# Patient Record
Sex: FEMALE | Race: AMERICAN INDIAN OR ALASKA NATIVE
[De-identification: names, ages, dates, MRNs, and addresses within clinical notes are randomized per-mention and may not be internally consistent; named-entity substitution may affect disease eponyms.]

---

## 2017-08-03 ENCOUNTER — HOSPITAL ENCOUNTER (EMERGENCY)
Dept: HOSPITAL 11 - JP.ED | Age: 77
LOS: 1 days | Discharge: NURSING, REHAB OR LONG-TERM CARE | End: 2017-08-04
Payer: MEDICARE

## 2017-08-03 DIAGNOSIS — Z86.73: ICD-10-CM

## 2017-08-03 DIAGNOSIS — E78.00: ICD-10-CM

## 2017-08-03 DIAGNOSIS — E11.52: Primary | ICD-10-CM

## 2017-08-03 DIAGNOSIS — J44.9: ICD-10-CM

## 2017-08-03 DIAGNOSIS — K21.9: ICD-10-CM

## 2017-08-03 DIAGNOSIS — F32.9: ICD-10-CM

## 2017-08-03 DIAGNOSIS — N39.0: ICD-10-CM

## 2017-08-03 DIAGNOSIS — D64.9: ICD-10-CM

## 2017-08-03 DIAGNOSIS — Z79.4: ICD-10-CM

## 2017-08-03 DIAGNOSIS — Z79.82: ICD-10-CM

## 2017-08-03 DIAGNOSIS — Z98.49: ICD-10-CM

## 2017-08-03 DIAGNOSIS — L03.116: ICD-10-CM

## 2017-08-03 DIAGNOSIS — Z79.84: ICD-10-CM

## 2017-08-03 DIAGNOSIS — Z79.899: ICD-10-CM

## 2017-08-03 DIAGNOSIS — Z90.49: ICD-10-CM

## 2017-08-03 DIAGNOSIS — I10: ICD-10-CM

## 2017-08-03 PROCEDURE — 87088 URINE BACTERIA CULTURE: CPT

## 2017-08-03 PROCEDURE — 81001 URINALYSIS AUTO W/SCOPE: CPT

## 2017-08-03 PROCEDURE — 87186 SC STD MICRODIL/AGAR DIL: CPT

## 2017-08-03 PROCEDURE — 86140 C-REACTIVE PROTEIN: CPT

## 2017-08-03 PROCEDURE — 71010: CPT

## 2017-08-03 PROCEDURE — 87449 NOS EACH ORGANISM AG IA: CPT

## 2017-08-03 PROCEDURE — 85025 COMPLETE CBC W/AUTO DIFF WBC: CPT

## 2017-08-03 PROCEDURE — 87086 URINE CULTURE/COLONY COUNT: CPT

## 2017-08-03 PROCEDURE — 85610 PROTHROMBIN TIME: CPT

## 2017-08-03 PROCEDURE — 36415 COLL VENOUS BLD VENIPUNCTURE: CPT

## 2017-08-03 PROCEDURE — 87040 BLOOD CULTURE FOR BACTERIA: CPT

## 2017-08-03 PROCEDURE — 93005 ELECTROCARDIOGRAM TRACING: CPT

## 2017-08-03 PROCEDURE — 82150 ASSAY OF AMYLASE: CPT

## 2017-08-03 PROCEDURE — 82803 BLOOD GASES ANY COMBINATION: CPT

## 2017-08-03 PROCEDURE — 80053 COMPREHEN METABOLIC PANEL: CPT

## 2017-08-03 PROCEDURE — 83605 ASSAY OF LACTIC ACID: CPT

## 2017-08-03 PROCEDURE — 83690 ASSAY OF LIPASE: CPT

## 2017-08-03 PROCEDURE — 86803 HEPATITIS C AB TEST: CPT

## 2017-08-03 PROCEDURE — 99285 EMERGENCY DEPT VISIT HI MDM: CPT

## 2017-08-03 PROCEDURE — 87340 HEPATITIS B SURFACE AG IA: CPT

## 2017-08-04 VITALS — DIASTOLIC BLOOD PRESSURE: 75 MMHG | SYSTOLIC BLOOD PRESSURE: 102 MMHG

## 2017-08-04 NOTE — EDM.PDOC
ED HPI GENERAL MEDICAL PROBLEM





- General


Chief Complaint: Upper Extremity Injury/Pain


Stated Complaint: EVAL OF FOOT


Time Seen by Provider: 08/03/17 18:04


Source of Information: Reports: Nursing Home Records, RN


History Limitations: Reports: No Limitations





- History of Present Illness


INITIAL COMMENTS - FREE TEXT/NARRATIVE: 





History of present illness:


[77-year-old female [facility] with a history of having had 

bilateral femorofemoral bypass grafts placed 1 week ago in [facility]. The vascular surgeon that performed this procedure is from [place] and 

resides in [place]. Apparently a surgeon that covers for him was called regarding 

the fact that she hadn't been doing well and he recommended she be evaluated in 

our emergency room.


Patient has a history of dementia and is a very poor historian. She has a 

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 

and has developed gangrene of her left great toe and anterior left shin. ]





Review of systems: 


As per history of present illness and below otherwise all systems reviewed and 

negative.





Past medical history: 


As per history of present illness and as reviewed below otherwise 

noncontributory.





Surgical history: 


As per history of present illness and as reviewed below otherwise 

noncontributory.





Social history: 


No reported history of drug or alcohol abuse.





Family history: 


As per history of present illness and as reviewed below otherwise 

noncontributory.





Physical exam:


HEENT: Atraumatic, normocephalic, pupils reactive, negative for conjunctival 

pallor or scleral icterus, mucous membranes moist, throat clear, neck supple, 

nontender, trachea midline.


Lungs: Clear to auscultation


Heart: S1S2, regular,


Abdomen: Soft, nondistended, nontender. 


Pelvis: Stable nontender.


Genitourinary: Examination of the surgical sites in her groin reveal no 

evidence of infection. Steri-Strips are intact there is no surrounding erythema 

but the right groin reveals a hard ropelike mass that is concerning for 

possible clotting off of her graft. There is no erythema or warmth of this area.


Rectal: Deferred.


Extremities: Her lower extremities remarkable for evidence of severe peripheral 

vascular disease with the amputation mentioned in history of present illness 

and wet gangrene present of the left great toe extending onto the top of the 

foot and also the anterior shin.


Neuro: Awake, alert,  Exam nonfocal.





Diagnostics:


[CBC is concerning an elevated white count of 18,000 platelets are over 1000 

her CRP is elevated urinalysis shows packed white cells with many bacteria. Her 

chest x-ray did not reveal any consolidations. She is afebrile while in our 

emergency department and she's become tachycardic and her blood pressures 

dropped 125-117. Her tachycardia is in the range of 105-110. ]





Therapeutics:


[I gave her 400 mg of IV Cipro and I just ordered 1 g of vancomycin.]





Impression: 


[Cellulitis of the left lower extremity with severe peripheral vascular disease


UTI]





Plan:


[Surly this patient is at risk for developing sepsis and may be developing 

sepsis at this time. Blood cultures have been obtained as well as urine 

culture. I suspect the major source of her infection is her left lower 

extremity deep cellulitis of that extremity and gangrene. Her recent surgical 

grafts could also be a source but we in our facility could not investigate 

this. Arrangements 7 made for her to be transferred to [place] in [place] 

 was gracious enough to accept her. She is a challenging and 

complicated patient and we appreciate their willingness to help take care of 

her.]





Definitive disposition and diagnosis as appropriate pending reevaluation and 

review of above.








  ** Left Feet


Pain Score (Numeric/FACES): 9





  ** generalized


Pain Score (Numeric/FACES): 9





- Related Data


 Allergies











Allergy/AdvReac Type Severity Reaction Status Date / Time


 


tape Allergy  Rash Uncoded 08/03/17 17:18











Home Meds: 


 Home Meds





Clopidogrel [Plavix] 75 mg PO DAILY 10/13/16 [History]


metFORMIN [Glucophage] 1,000 mg PO BIDMEALS 10/13/16 [History]


rOPINIRole HCl [Requip] 1.5 mg PO QPM 10/13/16 [History]


Mirtazapine [Remeron] 15 mg PO BEDTIME 10/17/16 [History]


Aspirin [Lo-Dose Aspirin EC] 81 mg PO DAILY 01/26/17 [History]


Dextran 70/Hypromellose/PF [Artificial Tears Drops] 2 each OP Q2H PRN 01/26/17 [

History]


Insulin Glargine,Hum.Rec.Anlog [Lantus Solostar] 30 units SQ DAILY 01/26/17 [

History]


Polyethylene Glycol 3350 [MiraLAX] 17 gm PO BEDTIME 01/26/17 [History]


Ascorbic Acid [Vitamin C] 500 mg PO DAILY 08/03/17 [History]


Ferrous Sulfate 325 mg PO DAILY 08/03/17 [History]


Foam Bandage [Allevyn] 1 bandage TOP Q3D 08/03/17 [History]


Insulin Aspart [NovoLOG] 5 unit SQ DAILY 08/03/17 [History]


Melatonin 5 mg PO BEDTIME 08/03/17 [History]


Multivitamin [Daily Multiple Vitamin] 1 tab PO DAILY 08/03/17 [History]


Omeprazole 40 mg PO DAILY 08/03/17 [History]


Sucralfate [Carafate] 1 gm PO TID 08/03/17 [History]


amLODIPine [Norvasc] 5 mg PO DAILY 08/03/17 [History]


oxyCODONE ER [OxyCONTIN] 10 mg PO Q12H 08/03/17 [History]


oxyCODONE HCl/Acetaminophen [Percocet 5-325 mg Tablet] 2 tab PO Q6H PRN 08/03/ 17 [History]











Past Medical History


HEENT History: Reports: Hard of Hearing, Impaired Vision


Cardiovascular History: Reports: High Cholesterol, Hypertension, PVD


Respiratory History: Reports: COPD


Gastrointestinal History: Reports: GERD


Genitourinary History: Reports: Diabetic Nephropathy


OB/GYN History: Reports: Pregnancy


Musculoskeletal History: Reports: Amputation, Arthritis, Back Pain, Chronic, 

Other (See Below)


Other Musculoskeletal History: leg pain from arthritis


Neurological History: Reports: CVA, Neuropathy, Diabetic


Psychiatric History: Reports: Dementia, Depression


Endocrine/Metabolic History: Reports: Diabetes, Type II


Hematologic History: Reports: Anemia


Dermatologic History: Reports: Other (See Below)


Other Dermatologic History: db. foot ulcers





- Infectious Disease History


Infectious Disease History: Reports: MRSA


Other Infectious Disease History: infectious diseases unknown to patient





- Past Surgical History


HEENT Surgical History: Reports: Cataract Surgery


Respiratory Surgical History: Reports: None


GI Surgical History: Reports: Cholecystectomy





Social & Family History





- Family History


Family Medical History: Noncontributory





- Tobacco Use


Smoking Status *Q: Unknown Ever Smoked


Years of Tobacco use: 50


Packs/Tins Daily: 1


Used Tobacco, but Quit: Yes


Month Tobacco Last Used: 5/31/16


Second Hand Smoke Exposure: No





- Caffeine Use


Caffeine Use: Reports: Coffee





- Recreational Drug Use


Recreational Drug Use: No





Review of Systems





- Review of Systems


Review Of Systems: ROS reveals no pertinent complaints other than HPI.





ED EXAM, GENERAL





- Physical Exam


Exam: See Below





Course





- Vital Signs


Last Recorded V/S: 





 Last Vital Signs











Temp  36.3 C   08/03/17 23:50


 


Pulse  99   08/03/17 23:50


 


Resp  16   08/03/17 23:50


 


BP  104/73   08/03/17 23:50


 


Pulse Ox  94 L  08/03/17 23:50














- Orders/Labs/Meds


Orders: 





 Active Orders 24 hr











 Category Date Time Status


 


 EKG Documentation Completion [RC] ASDIRECTED Care  08/03/17 19:12 Active


 


 Chest 1V Frontal [CR] Stat Exams  08/03/17 19:10 Taken


 


 VL Duplex Lwr Ext Art Ltd Rt [US] Stat Exams  08/03/17 18:25 Ordered


 


 CULTURE BLOOD [BC] Stat Lab  08/03/17 18:30 Received


 


 CULTURE BLOOD [BC] Stat Lab  08/03/17 19:30 Received


 


 CULTURE URINE [RM] Stat Lab  08/03/17 22:30 Received


 


 HEPATITIS B SURFACE ANTIGEN [REF] Urgent Lab  08/03/17 21:52 Received


 


 HEPATITIS C ANTIBODY [REF] Urgent Lab  08/03/17 21:52 Received


 


 Sodium Chloride 0.9% [Normal Saline] 1,000 ml Med  08/03/17 19:15 Active





 IV ASDIRECTED   


 


 Vancomycin 1 gm Med  08/03/17 23:52 Ordered





 Sodium Chloride 0.9% [Normal Saline] 250 ml   





 IV ONETIME   


 


 EKG 12 Lead [EK] Stat Ther  08/03/17 19:11 Ordered








 Medication Orders





Sodium Chloride (Normal Saline)  1,000 mls @ 250 mls/hr IV ASDIRECTED [facility]


   Last Admin: 08/03/17 19:37  Dose: 250 mls/hr


Vancomycin HCl 1 gm/ Sodium (Chloride)  250 mls @ 150 mls/hr IV ONETIME ONE


   Stop: 08/04/17 01:31








Labs: 





 Laboratory Tests











  08/03/17 08/03/17 08/03/17 Range/Units





  18:24 18:24 19:09 


 


WBC  18.0 H    (4.5-11.0)  K/uL


 


RBC  3.65    (3.30-5.50)  M/uL


 


Hgb  9.5 L    (12.0-15.0)  g/dL


 


Hct  30.0 L    (36.0-48.0)  %


 


MCV  82    (80-98)  fL


 


MCH  26 L    (27-31)  pg


 


MCHC  32    (32-36)  %


 


Plt Count  1032 H*    (150-400)  K/uL


 


Neut % (Auto)  73 H    (36-66)  %


 


Lymph % (Auto)  12 L    (24-44)  %


 


Mono % (Auto)  9 H    (2-6)  %


 


Eos % (Auto)  5 H    (2-4)  %


 


Baso % (Auto)  0    (0-1)  %


 


PT    9.8  (9.5-12.0)  sec


 


INR    0.92  (0.80-1.20)  


 


ABG Hemoglobin     (12.0-16.0)  g/dL


 


ABG Oxyhemoglobin     %


 


ABG Carboxyhemoglobin     (0.0-1.6)  %


 


ABG Methemoglobin     %


 


VBG pH     (7.350-7.450)  


 


VBG pCO2     mm/Hg


 


VBG pO2     mm/Hg


 


VBG HCO3     mmol/L


 


VBG Total CO2     mmol/L


 


VBG O2 Saturation     


 


VBG O2 Content     %vol


 


VBG Base Excess     mm/L


 


O2 Delivery Device     


 


Sodium   133 L   (140-148)  mmol/L


 


Potassium   4.0   (3.6-5.2)  mmol/L


 


Chloride   99 L   (100-108)  mmol/L


 


Carbon Dioxide   27   (21-32)  mmol/L


 


Anion Gap   11.0   (5.0-14.0)  mmol/L


 


BUN   20 H   (7-18)  mg/dL


 


Creatinine   1.0   (0.6-1.0)  mg/dL


 


Est Cr Clr Drug Dosing   35.77   mL/min


 


Estimated GFR (MDRD)   53 L   (>60)  


 


Glucose   93   ()  mg/dL


 


Lactic Acid     (0.4-2.0)  mmol/L


 


Calcium   8.5   (8.5-10.1)  mg/dL


 


Total Bilirubin   0.5  D   (0.2-1.0)  mg/dL


 


AST   13 L   (15-37)  U/L


 


ALT   15   (12-78)  U/L


 


Alkaline Phosphatase   129 H   ()  U/L


 


C-Reactive Protein   7.15 H   (0.0-0.3)  mg/dL


 


Total Protein   7.6   (6.4-8.2)  g/dL


 


Albumin   2.5 L   (3.4-5.0)  g/dL


 


Globulin   5.1 H   (2.3-3.5)  g/dL


 


Albumin/Globulin Ratio   0.5 L   (1.2-2.2)  


 


Amylase     ()  U/L


 


Lipase     ()  U/L


 


Urine Color     


 


Urine Appearance     


 


Urine pH     (4.5-8.0)  


 


Ur Specific Gravity     (1.008-1.030)  


 


Urine Protein     (NEGATIVE)  mg/dL


 


Urine Glucose (UA)     (NEGATIVE)  mg/dL


 


Urine Ketones     (NEGATIVE)  mg/dL


 


Urine Occult Blood     (NEGATIVE)  


 


Urine Nitrite     (NEGATIVE)  


 


Urine Bilirubin     (NEGATIVE)  


 


Urine Urobilinogen     (NORMAL)  mg/dL


 


Ur Leukocyte Esterase     (NEGATIVE)  


 


Urine RBC     (0-5)  


 


Urine WBC     (0-5)  


 


Ur Epithelial Cells     


 


Amorphous Sediment     


 


Urine Bacteria     


 


Urine Mucus     


 


HIV-1 Ab Rapid Screen     (NON-REACT.)  














  08/03/17 08/03/17 08/03/17 Range/Units





  19:10 19:10 19:10 


 


WBC     (4.5-11.0)  K/uL


 


RBC     (3.30-5.50)  M/uL


 


Hgb     (12.0-15.0)  g/dL


 


Hct     (36.0-48.0)  %


 


MCV     (80-98)  fL


 


MCH     (27-31)  pg


 


MCHC     (32-36)  %


 


Plt Count     (150-400)  K/uL


 


Neut % (Auto)     (36-66)  %


 


Lymph % (Auto)     (24-44)  %


 


Mono % (Auto)     (2-6)  %


 


Eos % (Auto)     (2-4)  %


 


Baso % (Auto)     (0-1)  %


 


PT     (9.5-12.0)  sec


 


INR     (0.80-1.20)  


 


ABG Hemoglobin  9.0 L    (12.0-16.0)  g/dL


 


ABG Oxyhemoglobin  70.1    %


 


ABG Carboxyhemoglobin  3.6 H    (0.0-1.6)  %


 


ABG Methemoglobin  0.3    %


 


VBG pH  7.429    (7.350-7.450)  


 


VBG pCO2  37.7    mm/Hg


 


VBG pO2  38.2    mm/Hg


 


VBG HCO3  24.5    mmol/L


 


VBG Total CO2  23.1    mmol/L


 


VBG O2 Saturation  72.9    


 


VBG O2 Content  8.9    %vol


 


VBG Base Excess  0.8    mm/L


 


O2 Delivery Device  Room air    


 


Sodium     (140-148)  mmol/L


 


Potassium     (3.6-5.2)  mmol/L


 


Chloride     (100-108)  mmol/L


 


Carbon Dioxide     (21-32)  mmol/L


 


Anion Gap     (5.0-14.0)  mmol/L


 


BUN     (7-18)  mg/dL


 


Creatinine     (0.6-1.0)  mg/dL


 


Est Cr Clr Drug Dosing     mL/min


 


Estimated GFR (MDRD)     (>60)  


 


Glucose     ()  mg/dL


 


Lactic Acid    1.6  (0.4-2.0)  mmol/L


 


Calcium     (8.5-10.1)  mg/dL


 


Total Bilirubin     (0.2-1.0)  mg/dL


 


AST     (15-37)  U/L


 


ALT     (12-78)  U/L


 


Alkaline Phosphatase     ()  U/L


 


C-Reactive Protein     (0.0-0.3)  mg/dL


 


Total Protein     (6.4-8.2)  g/dL


 


Albumin     (3.4-5.0)  g/dL


 


Globulin     (2.3-3.5)  g/dL


 


Albumin/Globulin Ratio     (1.2-2.2)  


 


Amylase   19 L   ()  U/L


 


Lipase   51 L   ()  U/L


 


Urine Color     


 


Urine Appearance     


 


Urine pH     (4.5-8.0)  


 


Ur Specific Gravity     (1.008-1.030)  


 


Urine Protein     (NEGATIVE)  mg/dL


 


Urine Glucose (UA)     (NEGATIVE)  mg/dL


 


Urine Ketones     (NEGATIVE)  mg/dL


 


Urine Occult Blood     (NEGATIVE)  


 


Urine Nitrite     (NEGATIVE)  


 


Urine Bilirubin     (NEGATIVE)  


 


Urine Urobilinogen     (NORMAL)  mg/dL


 


Ur Leukocyte Esterase     (NEGATIVE)  


 


Urine RBC     (0-5)  


 


Urine WBC     (0-5)  


 


Ur Epithelial Cells     


 


Amorphous Sediment     


 


Urine Bacteria     


 


Urine Mucus     


 


HIV-1 Ab Rapid Screen     (NON-REACT.)  














  08/03/17 08/03/17 Range/Units





  20:57 21:30 


 


WBC    (4.5-11.0)  K/uL


 


RBC    (3.30-5.50)  M/uL


 


Hgb    (12.0-15.0)  g/dL


 


Hct    (36.0-48.0)  %


 


MCV    (80-98)  fL


 


MCH    (27-31)  pg


 


MCHC    (32-36)  %


 


Plt Count    (150-400)  K/uL


 


Neut % (Auto)    (36-66)  %


 


Lymph % (Auto)    (24-44)  %


 


Mono % (Auto)    (2-6)  %


 


Eos % (Auto)    (2-4)  %


 


Baso % (Auto)    (0-1)  %


 


PT    (9.5-12.0)  sec


 


INR    (0.80-1.20)  


 


ABG Hemoglobin    (12.0-16.0)  g/dL


 


ABG Oxyhemoglobin    %


 


ABG Carboxyhemoglobin    (0.0-1.6)  %


 


ABG Methemoglobin    %


 


VBG pH    (7.350-7.450)  


 


VBG pCO2    mm/Hg


 


VBG pO2    mm/Hg


 


VBG HCO3    mmol/L


 


VBG Total CO2    mmol/L


 


VBG O2 Saturation    


 


VBG O2 Content    %vol


 


VBG Base Excess    mm/L


 


O2 Delivery Device    


 


Sodium    (140-148)  mmol/L


 


Potassium    (3.6-5.2)  mmol/L


 


Chloride    (100-108)  mmol/L


 


Carbon Dioxide    (21-32)  mmol/L


 


Anion Gap    (5.0-14.0)  mmol/L


 


BUN    (7-18)  mg/dL


 


Creatinine    (0.6-1.0)  mg/dL


 


Est Cr Clr Drug Dosing    mL/min


 


Estimated GFR (MDRD)    (>60)  


 


Glucose    ()  mg/dL


 


Lactic Acid    (0.4-2.0)  mmol/L


 


Calcium    (8.5-10.1)  mg/dL


 


Total Bilirubin    (0.2-1.0)  mg/dL


 


AST    (15-37)  U/L


 


ALT    (12-78)  U/L


 


Alkaline Phosphatase    ()  U/L


 


C-Reactive Protein    (0.0-0.3)  mg/dL


 


Total Protein    (6.4-8.2)  g/dL


 


Albumin    (3.4-5.0)  g/dL


 


Globulin    (2.3-3.5)  g/dL


 


Albumin/Globulin Ratio    (1.2-2.2)  


 


Amylase    ()  U/L


 


Lipase    ()  U/L


 


Urine Color  Yellow   


 


Urine Appearance  Cloudy   


 


Urine pH  6.0   (4.5-8.0)  


 


Ur Specific Gravity  1.010   (1.008-1.030)  


 


Urine Protein  30 H   (NEGATIVE)  mg/dL


 


Urine Glucose (UA)  Normal   (NEGATIVE)  mg/dL


 


Urine Ketones  Negative   (NEGATIVE)  mg/dL


 


Urine Occult Blood  Negative   (NEGATIVE)  


 


Urine Nitrite  Negative   (NEGATIVE)  


 


Urine Bilirubin  Negative   (NEGATIVE)  


 


Urine Urobilinogen  Normal   (NORMAL)  mg/dL


 


Ur Leukocyte Esterase  Large   (NEGATIVE)  


 


Urine RBC  Semi-packed H   (0-5)  


 


Urine WBC  Semi-packed H   (0-5)  


 


Ur Epithelial Cells  Moderate   


 


Amorphous Sediment  Few   


 


Urine Bacteria  Moderate   


 


Urine Mucus  Few   


 


HIV-1 Ab Rapid Screen   Non-reactive  (NON-REACT.)  











Meds: 





Medications











Generic Name Dose Route Start Last Admin





  Trade Name Freq  PRN Reason Stop Dose Admin


 


Sodium Chloride  1,000 mls @ 250 mls/hr  08/03/17 19:15  08/03/17 19:37





  Normal Saline  IV   250 mls/hr





  ASDIRECTED CARMEL   Administration


 


Vancomycin HCl 1 gm/ Sodium  250 mls @ 150 mls/hr  08/03/17 23:52  





  Chloride  IV  08/04/17 01:31  





  ONETIME ONE   














Discontinued Medications














Generic Name Dose Route Start Last Admin





  Trade Name Freq  PRN Reason Stop Dose Admin


 


Hydromorphone HCl  0.5 mg  08/03/17 23:39  08/03/17 23:45





  Dilaudid  IVPUSH  08/03/17 23:40  0.5 mg





  ONETIME ONE   Administration


 


Ciprofloxacin/Dextrose 400 mg/  200 mls @ 200 mls/hr  08/03/17 22:28  08/03/17 

22:40





  Premix  IV  08/03/17 23:27  200 mls/hr





  ONETIME ONE   Administration














Departure





- Departure


Time of Disposition: 00:04


Disposition: DC/[facility]fer to Acute Hospital [facility]


Condition: Fair


Clinical Impression: 


 Gangrene, Peripheral vascular disease due to secondary diabetes





Cellulitis


Qualifiers:


 Site of cellulitis: extremity Site of cellulitis of extremity: lower extremity 

Laterality: left Qualified Code(s): L03.116 - Cellulitis of left lower limb





UTI (urinary tract infection)


Qualifiers:


 Urinary tract infection type: site unspecified Hematuria presence: with 

hematuria Qualified Code(s): N39.0 - Urinary tract infection, site not specified

; R31.9 - Hematuria, unspecified








- Discharge Information


Forms:  ED Department Discharge





- My Orders


Last 24 Hours: 





My Active Orders





08/03/17 18:25


VL Duplex Lwr Ext Art Ltd Rt [US] Stat 





08/03/17 18:30


CULTURE BLOOD [BC] Stat 





08/03/17 19:10


Chest 1V Frontal [CR] Stat 





08/03/17 19:11


EKG 12 Lead [EK] Stat 





08/03/17 19:12


EKG Documentation Completion [RC] ASDIRECTED 





08/03/17 19:15


Sodium Chloride 0.9% [Normal Saline] 1,000 ml IV ASDIRECTED 





08/03/17 19:30


CULTURE BLOOD [BC] Stat 





08/03/17 21:52


HEPATITIS B SURFACE ANTIGEN [REF] Urgent 


HEPATITIS C ANTIBODY [REF] Urgent 





08/03/17 22:30


CULTURE URINE [RM] Stat 





08/03/17 23:52


Vancomycin 1 gm   Sodium Chloride 0.9% [Normal Saline] 250 ml IV ONETIME 














- Assessment/Plan


Last 24 Hours: 





My Active Orders





08/03/17 18:25


VL Duplex Lwr Ext Art Ltd Rt [US] Stat 





08/03/17 18:30


CULTURE BLOOD [BC] Stat 





08/03/17 19:10


Chest 1V Frontal [CR] Stat 





08/03/17 19:11


EKG 12 Lead [EK] Stat 





08/03/17 19:12


EKG Documentation Completion [RC] ASDIRECTED 





08/03/17 19:15


Sodium Chloride 0.9% [Normal Saline] 1,000 ml IV ASDIRECTED 





08/03/17 19:30


CULTURE BLOOD [BC] Stat 





08/03/17 21:52


HEPATITIS B SURFACE ANTIGEN [REF] Urgent 


HEPATITIS C ANTIBODY [REF] Urgent 





08/03/17 22:30


CULTURE URINE [RM] Stat 





08/03/17 23:52


Vancomycin 1 gm   Sodium Chloride 0.9% [Normal Saline] 250 ml IV ONETIME

## 2018-01-12 ENCOUNTER — HOSPITAL ENCOUNTER (EMERGENCY)
Dept: HOSPITAL 11 - JP.ED | Age: 78
Discharge: NURSING, REHAB OR LONG-TERM CARE | End: 2018-01-12
Payer: MEDICARE

## 2018-01-12 VITALS — SYSTOLIC BLOOD PRESSURE: 146 MMHG | DIASTOLIC BLOOD PRESSURE: 84 MMHG

## 2018-01-12 DIAGNOSIS — K21.9: ICD-10-CM

## 2018-01-12 DIAGNOSIS — Z79.82: ICD-10-CM

## 2018-01-12 DIAGNOSIS — I10: ICD-10-CM

## 2018-01-12 DIAGNOSIS — Z79.4: ICD-10-CM

## 2018-01-12 DIAGNOSIS — J44.9: ICD-10-CM

## 2018-01-12 DIAGNOSIS — D64.9: ICD-10-CM

## 2018-01-12 DIAGNOSIS — E78.00: ICD-10-CM

## 2018-01-12 DIAGNOSIS — Z89.421: ICD-10-CM

## 2018-01-12 DIAGNOSIS — Z79.899: ICD-10-CM

## 2018-01-12 DIAGNOSIS — Z91.09: ICD-10-CM

## 2018-01-12 DIAGNOSIS — Z87.891: ICD-10-CM

## 2018-01-12 DIAGNOSIS — E11.40: ICD-10-CM

## 2018-01-12 DIAGNOSIS — A41.9: Primary | ICD-10-CM

## 2018-01-12 DIAGNOSIS — L03.116: ICD-10-CM

## 2018-01-12 PROCEDURE — 96365 THER/PROPH/DIAG IV INF INIT: CPT

## 2018-01-12 PROCEDURE — 36415 COLL VENOUS BLD VENIPUNCTURE: CPT

## 2018-01-12 PROCEDURE — 80053 COMPREHEN METABOLIC PANEL: CPT

## 2018-01-12 PROCEDURE — 96375 TX/PRO/DX INJ NEW DRUG ADDON: CPT

## 2018-01-12 PROCEDURE — 83605 ASSAY OF LACTIC ACID: CPT

## 2018-01-12 PROCEDURE — 99285 EMERGENCY DEPT VISIT HI MDM: CPT

## 2018-01-12 PROCEDURE — 87040 BLOOD CULTURE FOR BACTERIA: CPT

## 2018-01-12 PROCEDURE — 71045 X-RAY EXAM CHEST 1 VIEW: CPT

## 2018-01-12 PROCEDURE — 85025 COMPLETE CBC W/AUTO DIFF WBC: CPT

## 2018-01-12 PROCEDURE — 96361 HYDRATE IV INFUSION ADD-ON: CPT

## 2018-01-12 PROCEDURE — 86140 C-REACTIVE PROTEIN: CPT

## 2018-01-12 PROCEDURE — 81001 URINALYSIS AUTO W/SCOPE: CPT

## 2018-01-15 NOTE — CR
Portable chest

 

The heart and vascular structures are within normal limits. There is mild scarring at the left base. 
There are no infiltrates or effusions. There is evidence of a severely subluxed or dislocated left sh
oulder.

 

Impression:

1. No acute findings of the chest.

2. Recommend clinical correlation for possible dislocation of the left shoulder.